# Patient Record
Sex: FEMALE | Race: WHITE | URBAN - METROPOLITAN AREA
[De-identification: names, ages, dates, MRNs, and addresses within clinical notes are randomized per-mention and may not be internally consistent; named-entity substitution may affect disease eponyms.]

---

## 2022-08-04 ENCOUNTER — OFFICE VISIT (OUTPATIENT)
Dept: URBAN - METROPOLITAN AREA CLINIC 111 | Facility: CLINIC | Age: 47
End: 2022-08-04
Payer: COMMERCIAL

## 2022-08-04 ENCOUNTER — DASHBOARD ENCOUNTERS (OUTPATIENT)
Age: 47
End: 2022-08-04

## 2022-08-04 ENCOUNTER — WEB ENCOUNTER (OUTPATIENT)
Dept: URBAN - METROPOLITAN AREA CLINIC 111 | Facility: CLINIC | Age: 47
End: 2022-08-04

## 2022-08-04 VITALS
WEIGHT: 202.4 LBS | TEMPERATURE: 97.5 F | HEART RATE: 58 BPM | SYSTOLIC BLOOD PRESSURE: 125 MMHG | DIASTOLIC BLOOD PRESSURE: 86 MMHG | BODY MASS INDEX: 29.98 KG/M2 | HEIGHT: 69 IN

## 2022-08-04 DIAGNOSIS — Z12.11 SCREENING FOR COLON CANCER: ICD-10-CM

## 2022-08-04 DIAGNOSIS — Z85.841: ICD-10-CM

## 2022-08-04 PROCEDURE — 99203 OFFICE O/P NEW LOW 30 MIN: CPT | Performed by: NURSE PRACTITIONER

## 2022-08-04 NOTE — HPI-TODAY'S VISIT:
45 yo female patient presents for a colon cancer screening.  Patient denies change in bowel habits, appetite and weight. Denies family history of colon polyps or cancer. Denies any GI symptoms currently. Has normal bowel movement. Denies blood in stool. Has history of glioblastoma dx in 2005, tumor was removed at [facility], sees a specialist at [facility] regularly. Been taking levetracetam ER since then. No prior colonoscopy. Last EGD in 2011 for acid reflux, bx results nml. Denies any acid reflux sxs. Denies prior difficulty with anesthesia or EGD. Not on blood thinner. Denies heart or lung diseases. Sees pcp regularly at [facility] for RHCM,

## 2022-11-21 ENCOUNTER — OFFICE VISIT (OUTPATIENT)
Dept: URBAN - METROPOLITAN AREA LAB 3 | Facility: LAB | Age: 47
End: 2022-11-21
Payer: COMMERCIAL

## 2022-11-21 DIAGNOSIS — Z12.11 COLON CANCER SCREENING: ICD-10-CM

## 2022-11-21 PROCEDURE — G0121 COLON CA SCRN NOT HI RSK IND: HCPCS | Performed by: INTERNAL MEDICINE
